# Patient Record
Sex: FEMALE | URBAN - METROPOLITAN AREA
[De-identification: names, ages, dates, MRNs, and addresses within clinical notes are randomized per-mention and may not be internally consistent; named-entity substitution may affect disease eponyms.]

---

## 2020-04-29 ENCOUNTER — TELEHEALTH (OUTPATIENT)
Dept: URBAN - METROPOLITAN AREA CLINIC 32 | Facility: CLINIC | Age: 55
Setting detail: DERMATOLOGY
End: 2020-04-29

## 2020-04-29 DIAGNOSIS — L57.0 ACTINIC KERATOSIS: ICD-10-CM

## 2020-04-29 PROCEDURE — 99202 OFFICE O/P NEW SF 15 MIN: CPT

## 2020-11-12 ENCOUNTER — OFFICE VISIT (OUTPATIENT)
Dept: URBAN - METROPOLITAN AREA CLINIC 96 | Facility: CLINIC | Age: 55
End: 2020-11-12
Payer: COMMERCIAL

## 2020-11-12 ENCOUNTER — WEB ENCOUNTER (OUTPATIENT)
Dept: URBAN - METROPOLITAN AREA CLINIC 96 | Facility: CLINIC | Age: 55
End: 2020-11-12

## 2020-11-12 DIAGNOSIS — K64.8 INTERNAL HEMORRHOID: ICD-10-CM

## 2020-11-12 DIAGNOSIS — Z12.11 SCREEN FOR COLON CANCER: ICD-10-CM

## 2020-11-12 PROCEDURE — 99243 OFF/OP CNSLTJ NEW/EST LOW 30: CPT | Performed by: INTERNAL MEDICINE

## 2020-11-12 PROCEDURE — G8482 FLU IMMUNIZE ORDER/ADMIN: HCPCS | Performed by: INTERNAL MEDICINE

## 2020-11-12 RX ORDER — SODIUM, POTASSIUM,MAG SULFATES 17.5-3.13G
354 ML SOLUTION, RECONSTITUTED, ORAL ORAL ONCE
Qty: 1 | Refills: 0 | OUTPATIENT
Start: 2020-11-12 | End: 2020-11-13

## 2020-11-12 NOTE — HPI-TODAY'S VISIT:
here for colonoscopy never had one before no GI sx. does state she has intermittent issues w/ fissures. denies blood or pain, but can have scant brb if she eats spicy food. states she feels like a little bubble down there. no c/d.  no famhx of CRC sent by Dr. Iris Collazo no c/o today

## 2020-12-21 ENCOUNTER — OFFICE VISIT (OUTPATIENT)
Dept: URBAN - METROPOLITAN AREA SURGERY CENTER 18 | Facility: SURGERY CENTER | Age: 55
End: 2020-12-21

## 2021-02-01 ENCOUNTER — OFFICE VISIT (OUTPATIENT)
Dept: URBAN - METROPOLITAN AREA SURGERY CENTER 18 | Facility: SURGERY CENTER | Age: 56
End: 2021-02-01

## 2021-03-17 ENCOUNTER — OFFICE VISIT (OUTPATIENT)
Dept: URBAN - METROPOLITAN AREA SURGERY CENTER 18 | Facility: SURGERY CENTER | Age: 56
End: 2021-03-17
Payer: COMMERCIAL

## 2021-03-17 DIAGNOSIS — Z12.11 COLON CANCER SCREENING: ICD-10-CM

## 2021-03-17 DIAGNOSIS — D12.4 ADENOMA OF DESCENDING COLON: ICD-10-CM

## 2021-03-17 PROCEDURE — 45380 COLONOSCOPY AND BIOPSY: CPT | Performed by: INTERNAL MEDICINE

## 2021-03-17 PROCEDURE — G8907 PT DOC NO EVENTS ON DISCHARG: HCPCS | Performed by: INTERNAL MEDICINE

## 2022-09-16 ENCOUNTER — TELEPHONE ENCOUNTER (OUTPATIENT)
Dept: URBAN - METROPOLITAN AREA CLINIC 98 | Facility: CLINIC | Age: 57
End: 2022-09-16

## 2022-09-26 ENCOUNTER — LAB OUTSIDE AN ENCOUNTER (OUTPATIENT)
Dept: URBAN - METROPOLITAN AREA CLINIC 98 | Facility: CLINIC | Age: 57
End: 2022-09-26

## 2022-09-26 ENCOUNTER — CLAIMS CREATED FROM THE CLAIM WINDOW (OUTPATIENT)
Dept: URBAN - METROPOLITAN AREA CLINIC 98 | Facility: CLINIC | Age: 57
End: 2022-09-26
Payer: COMMERCIAL

## 2022-09-26 ENCOUNTER — DASHBOARD ENCOUNTERS (OUTPATIENT)
Age: 57
End: 2022-09-26

## 2022-09-26 ENCOUNTER — WEB ENCOUNTER (OUTPATIENT)
Dept: URBAN - METROPOLITAN AREA CLINIC 98 | Facility: CLINIC | Age: 57
End: 2022-09-26

## 2022-09-26 VITALS
SYSTOLIC BLOOD PRESSURE: 101 MMHG | TEMPERATURE: 97 F | WEIGHT: 129.8 LBS | BODY MASS INDEX: 23 KG/M2 | HEIGHT: 63 IN | DIASTOLIC BLOOD PRESSURE: 62 MMHG | HEART RATE: 71 BPM

## 2022-09-26 DIAGNOSIS — K76.0 FATTY (CHANGE OF) LIVER, NOT ELSEWHERE CLASSIFIED: ICD-10-CM

## 2022-09-26 DIAGNOSIS — Z87.898 HISTORY OF HEADACHE: ICD-10-CM

## 2022-09-26 DIAGNOSIS — R74.8 ELEVATED LIVER ENZYMES: ICD-10-CM

## 2022-09-26 PROCEDURE — 99243 OFF/OP CNSLTJ NEW/EST LOW 30: CPT | Performed by: INTERNAL MEDICINE

## 2022-09-26 PROCEDURE — 99213 OFFICE O/P EST LOW 20 MIN: CPT | Performed by: INTERNAL MEDICINE

## 2022-09-26 NOTE — HPI-OTHER HISTORIES
RIGHT UPPER QUADRANT ABDOMINAL ULTRASOUND SEPTEMBER 7, 2022.  CLINICAL HISTORY: Abdominal pain. Nausea and vomiting. Right upper quadrant pain.  COMPARISON: Ultrasound study of June 13, 2017.  FINDINGS: The visualized pancreatic head and body are normal in contour and echotexture without pancreatic ductal dilatation. The distal pancreas is obscured by overlying bowel gas.  The visualized aorta and inferior vena cava are unremarkable. The visualized aorta measures 1.4 cm in maximum diameter.  The liver is normal in size and contour with mildly coarsened liver echotexture. There are no focal hepatic lesions or intrahepatic biliary ductal dilatation. The portal and hepatic veins are patent.  The common bile duct measures 4 mm in diameter.  The gallbladder is normal in size and wall thickness without pericholecystic fluid. There is a 2 mm adherent stone versus cholesterol polyp noted.  The right kidney is imaged in normal anatomic location and is normal in size, shape and parenchymal thickness without hydronephrosis or evident calculus disease. There is a partially exophytic 1.3 x 1.1 x 1.1 cm cyst arising from upper pole which appears simple with no further workup warranted. Right kidney measures 11.7 x 4.2 x 3.8 cm in size.  IMPRESSION:  1. Coarsened liver echotexture raising the question of hepatic steatosis and correlation with liver function tests is recommended. 2. Cholesterol polyp versus tiny adherent stone within the gallbladder without sonographic evidence of cholecystitis. 3. Simple right renal cyst. 4. Otherwise unremarkable right upper quadrant abdominal ultrasound.  Labs reviewed from [facility] system:    9/13/22 alp 187 * (H)	ast 71 * (H)	alt 93  9/6/22 alp 138 * (H) ast 43 alt 111 		 7/25/22 alp 50  ast 26 alt 26

## 2022-09-26 NOTE — HPI-TODAY'S VISIT:
HEre on referral from Dr Dennise Santiago for elevated liver tests : A copy of this note will be sent to her attention   since August : severe headaches, n/v/d.   headache on right temple - then goes to the left side.  also fatigue.  labs show elevated liver tests - new since July 2022 years ago: white cells were low. got better on her own.  she says off and on her liver tests are high. took exedrin  takes centrum over 50, vit C ([facility]) , Zinc ([facility])  denies herbal supplements  no energy.  started around birthday trip to [place] father passed away Jul 30th from "old age" mother and aunt w Alzheimers  2 months ago : was seen by Neurology, Dr Salinas

## 2022-09-28 PROBLEM — 197321007: Status: ACTIVE | Noted: 2022-09-28

## 2022-09-29 ENCOUNTER — TELEPHONE ENCOUNTER (OUTPATIENT)
Dept: URBAN - METROPOLITAN AREA CLINIC 98 | Facility: CLINIC | Age: 57
End: 2022-09-29

## 2022-09-29 LAB
A/G RATIO: 1.5
ACTIN (SMOOTH MUSCLE) ANTIBODY: 19
ALBUMIN: 4.3
ALKALINE PHOSPHATASE: 87
ALT (SGPT): 27
ANTI-SLA, IGG: 1.9
AST (SGOT): 21
BASO (ABSOLUTE): 0.1
BASOS: 2
BILIRUBIN, TOTAL: 0.2
BUN/CREATININE RATIO: 19
BUN: 14
CALCIUM: 9.6
CARBON DIOXIDE, TOTAL: 26
CHLORIDE: 102
CREATININE: 0.72
CYTOMEGALOVIRUS (CMV) AB, IGM: <30
EBV AB VCA, IGG: >600
EBV AB VCA, IGM: <36
EBV NUCLEAR ANTIGEN AB, IGG: 428
EGFR: 98
EOS (ABSOLUTE): 0.4
EOS: 12
FERRITIN, SERUM: 68
GGT: 36
GLOBULIN, TOTAL: 2.9
GLUCOSE: 94
HEMATOCRIT: 38.6
HEMATOLOGY COMMENTS:: (no result)
HEMOGLOBIN: 12.2
HEP A AB, TOTAL: POSITIVE
HEP B CORE AB, TOT: NEGATIVE
HEPATITIS B SURF AB QUANT: <3.1
IMMATURE CELLS: (no result)
IMMATURE GRANS (ABS): (no result)
IMMATURE GRANULOCYTES: (no result)
IMMUNOGLOBULIN A, QN, SERUM: 310
IMMUNOGLOBULIN G, QN, SERUM: 1327
IMMUNOGLOBULIN M, QN, SERUM: 71
INTERPRETATION:: (no result)
IRON BIND.CAP.(TIBC): 349
IRON SATURATION: 22
IRON: 77
LYMPHS (ABSOLUTE): 0.7
LYMPHS: 20
MCH: 27.2
MCHC: 31.6
MCV: 86
MITOCHONDRIAL (M2) ANTIBODY: <20
MONOCYTES(ABSOLUTE): 0.4
MONOCYTES: 12
NEUTROPHILS (ABSOLUTE): 1.9
NEUTROPHILS: 54
NRBC: (no result)
PLATELETS: 168
POTASSIUM: 4.7
PROTEIN, TOTAL: 7.2
RBC: 4.48
RDW: 12.8
SODIUM: 139
UIBC: 272
WBC: 3.6